# Patient Record
Sex: MALE | Race: WHITE | NOT HISPANIC OR LATINO | Employment: FULL TIME | URBAN - METROPOLITAN AREA
[De-identification: names, ages, dates, MRNs, and addresses within clinical notes are randomized per-mention and may not be internally consistent; named-entity substitution may affect disease eponyms.]

---

## 2022-01-12 ENCOUNTER — HOSPITAL ENCOUNTER (EMERGENCY)
Facility: HOSPITAL | Age: 32
Discharge: HOME | End: 2022-01-12
Attending: EMERGENCY MEDICINE | Admitting: EMERGENCY MEDICINE

## 2022-01-12 VITALS
DIASTOLIC BLOOD PRESSURE: 105 MMHG | HEIGHT: 74 IN | WEIGHT: 315 LBS | OXYGEN SATURATION: 97 % | RESPIRATION RATE: 20 BRPM | HEART RATE: 71 BPM | TEMPERATURE: 97.9 F | BODY MASS INDEX: 40.43 KG/M2 | SYSTOLIC BLOOD PRESSURE: 155 MMHG

## 2022-01-12 DIAGNOSIS — H61.23 BILATERAL IMPACTED CERUMEN: Primary | ICD-10-CM

## 2022-01-12 PROCEDURE — 99282 EMERGENCY DEPT VISIT SF MDM: CPT

## 2022-01-12 PROCEDURE — 99282 EMERGENCY DEPT VISIT SF MDM: CPT | Performed by: EMERGENCY MEDICINE

## 2022-01-12 NOTE — ED PROVIDER NOTES
History  Chief Complaint   Patient presents with    Ear Problem     pt presents to ed via walk in with complaint of right ear pain x4 weeks      79-year-old male presenting with right ear pain  This has been ongoing for the past 4 weeks  He has noted is mild hearing loss as well  He denies fevers  He has tried Advil with minimal relief  None       History reviewed  No pertinent past medical history  History reviewed  No pertinent surgical history  History reviewed  No pertinent family history  I have reviewed and agree with the history as documented  E-Cigarette/Vaping    E-Cigarette Use Never User      E-Cigarette/Vaping Substances     Social History     Tobacco Use    Smoking status: Former Smoker    Smokeless tobacco: Never Used   Vaping Use    Vaping Use: Never used   Substance Use Topics    Alcohol use: Never    Drug use: Never       Review of Systems   Constitutional: Negative for fever  HENT: Positive for ear pain  Negative for congestion  Eyes: Negative for visual disturbance  Respiratory: Negative for shortness of breath  Cardiovascular: Negative for chest pain  Gastrointestinal: Negative for abdominal pain  Musculoskeletal: Negative for neck pain  Skin: Negative for rash  Neurological: Negative for weakness  Psychiatric/Behavioral: The patient is not nervous/anxious  Physical Exam  Physical Exam  Vitals and nursing note reviewed  Constitutional:       General: He is not in acute distress  HENT:      Head: Normocephalic and atraumatic  Right Ear: There is impacted cerumen  Left Ear: There is impacted cerumen  Mouth/Throat:      Pharynx: No posterior oropharyngeal erythema  Eyes:      Conjunctiva/sclera: Conjunctivae normal    Cardiovascular:      Rate and Rhythm: Normal rate  Pulmonary:      Effort: No respiratory distress  Abdominal:      General: There is no distension  Musculoskeletal:      Cervical back: No rigidity  Skin:     General: Skin is warm and dry  Neurological:      Mental Status: He is alert  Mental status is at baseline  Psychiatric:         Mood and Affect: Mood normal          Vital Signs  ED Triage Vitals [01/12/22 0844]   Temperature Pulse Respirations Blood Pressure SpO2   97 9 °F (36 6 °C) 71 20 (!) 155/105 97 %      Temp Source Heart Rate Source Patient Position - Orthostatic VS BP Location FiO2 (%)   Oral Monitor Sitting Left arm --      Pain Score       No Pain           Vitals:    01/12/22 0844   BP: (!) 155/105   Pulse: 71   Patient Position - Orthostatic VS: Sitting         Visual Acuity      ED Medications  Medications - No data to display    Diagnostic Studies  Results Reviewed     None                 No orders to display              Procedures  Procedures         ED Course                                             MDM  Number of Diagnoses or Management Options  Bilateral impacted cerumen  Diagnosis management comments: 27-year-old male presenting with right ear pain for 4 weeks  He has bilateral cerumen impaction  He does not use Q-tips  We discussed over-the-counter Debrox solution  I discussed that I cannot visualize his ear drum  Discussed following up his primary care doctor for ear exam after the ear wax has been removed  We also discussed following up with a primary care doctor to reassess his high blood pressure  Discussed return precautions      Disposition  Final diagnoses:   Bilateral impacted cerumen     Time reflects when diagnosis was documented in both MDM as applicable and the Disposition within this note     Time User Action Codes Description Comment    1/12/2022  8:57 AM Rosie Stone Add [H61 [id number]] Bilateral impacted cerumen       ED Disposition     ED Disposition Condition Date/Time Comment    Discharge Stable Wed Jan 12, 2022  8:57 AM Otf Echevarria discharge to home/self care              Follow-up Information     Follow up With Specialties Details Why Contact Info Additional [facility] Medicine Schedule an appointment as soon as possible for a visit   [address] [id number]  [id number] , [place], [place], [address]          Patient's Medications    No medications on file       No discharge procedures on file      PDMP Review     None          ED Provider  Electronically Signed by           Mariela Bustos DO  01/12/22 0901

## 2022-01-12 NOTE — DISCHARGE INSTRUCTIONS
Please start using over-the-counter Debrox ear drops  This will help dissolve the wax over time  Both of ear years are clogged by ear wax  Your blood pressure was incidentally found to be high  Please have this followed up by the primary care doctor    Please return for worsening symptoms